# Patient Record
Sex: MALE | Race: WHITE
[De-identification: names, ages, dates, MRNs, and addresses within clinical notes are randomized per-mention and may not be internally consistent; named-entity substitution may affect disease eponyms.]

---

## 2019-03-14 ENCOUNTER — HOSPITAL ENCOUNTER (INPATIENT)
Dept: HOSPITAL 74 - YASAS | Age: 64
LOS: 4 days | Discharge: HOME | DRG: 897 | End: 2019-03-18
Attending: SURGERY | Admitting: SURGERY
Payer: COMMERCIAL

## 2019-03-14 VITALS — BODY MASS INDEX: 26.1 KG/M2

## 2019-03-14 DIAGNOSIS — F10.230: Primary | ICD-10-CM

## 2019-03-14 DIAGNOSIS — D69.6: ICD-10-CM

## 2019-03-14 DIAGNOSIS — E80.6: ICD-10-CM

## 2019-03-14 DIAGNOSIS — E87.6: ICD-10-CM

## 2019-03-14 PROCEDURE — HZ2ZZZZ DETOXIFICATION SERVICES FOR SUBSTANCE ABUSE TREATMENT: ICD-10-PCS | Performed by: SURGERY

## 2019-03-14 RX ADMIN — Medication SCH MG: at 22:31

## 2019-03-14 NOTE — HP
CIWA Score


Nausea/Vomitin-No Nausea/No Vomiting


Muscle Tremors: 7-Severe,w/o Arm Extended


Anxiety: 2


Agitation: 2


Paroxysmal Sweats: No Perspiration


Orientation: 3-Disoriented Date>2 days


Tacttile Disturbances: 0-None


Auditory Disturbances: 0-None


Visual Disturbances: 0-None


Headache: 0-None Present


CIWA-Ar Total Score: 14





- Admission Criteria


[facility] Guidelines: Admission for Medically Managed Detox: 


Requires at least one of the followin. CIWA greater than 12


2. Seizures within the past 24 hours


3. Delirium tremens within the past 24 hours


4. Hallucinations within the past 24 hours


5. Acute intervention needed for co  occurring medical disorder


6. Acute intervention needed for co  occurring psychiatric disorder


7. Severe withdrawal that cannot be handled at a lower level of care (continued


    vomiting, continued diarrhea, abnormal vital signs) requiring intravenous


    medication and/or fluids


8. Pregnancy





Patient presents the following: CIWA greater than 12


Admission Criteria Met: Admission criteria met





Admission ROS [facility]





- [facility]


Allergies/Adverse Reactions: 


 Allergies











Allergy/AdvReac Type Severity Reaction Status Date / Time


 


No Known Allergies Allergy   Verified 19 16:38











History of Present Illness: 





pt here requesting detox from etoh use , reports  drinking  alcohol since early 

 , reports  vodka 1/3 of a  large  bottle daily ,starts drinking after  

lunch  , current  symptoms as above,  loss of appetite  since yesterday ,  

reports tremors if not drinking  , memory loss, denies seizures,  + falls  

while intoxicated  , most recently 1 week ago fell on snow    w/ scraped  knees 

, had  difficulty getting  up " my knees are weak "  . 


denies illicits , denies tobacco  . 


PMHX : htn 


PSHx : denies  


Psych : denies 


meds : denies 


Shx :  lives  alone  , unemployed  


Exam Limitations: Clinical Condition





- Ebola screening


Have you traveled outside of the country in the last 21 days: No


Have you had contact with anyone from an Ebola affected area: No





- Review of Systems


Constitutional: See HPI, Loss of Appetite, Weakness


EENT: reports: Other (glasses  , denies dysphagia)


Respiratory: reports: No Symptoms reported


Cardiac: reports: No Symptoms Reported


GI: reports: See HPI


: reports: No Symptoms Reported


Musculoskeletal: reports: No Symptoms Reported


Integumentary: reports: No Symptoms Reported


Neuro: reports: No Symptoms reported


Psychiatric: reports: Orientated x3, Anxious





Patient History





- Smoking Cessation


Smoking history: Never smoked


Have you smoked in the past 12 months: No





- Substances Abused


  ** Alcohol


Route: Oral


Frequency: Daily


Amount used: 1 LITER OF VODKA


Age of first use: 19


Date of Last Use: 19





Family Disease History





- Family Disease History


Family History: Denies


Family Disease History: Heart Disease: Father (), Mother ()





Admission Physical Exam [facility]





- Physical


General Appearance: Yes: Mild Distress, Alcohol on Breath, Intoxicated, 

Tremorous, Anxious


HEENTM: Yes: EOMI, Hearing grossly Normal, Normocephalic, Normal Voice


Respiratory: Yes: Chest Non-Tender, Lungs Clear, Normal Breath Sounds


Neck: Yes: No masses,lesions,Nodules, Trachea in good position


Cardiology: Yes: Regular Rhythm, Regular Rate, S1, S2, Tachycardia


Abdominal: Yes: Normal Bowel Sounds, Soft


Back: Yes: Normal Inspection


Musculoskeletal: Yes: full range of Motion, Gait Steady


Extremities: Yes: Normal Capillary Refill, Non-Tender, Tremors


Neurological: Yes: Motor Strength 5/5


Integumentary: Yes: Normal Color, Dry





- Diagnostic


(1) Alcohol dependence


Current Visit: Yes   Status: Acute   


Qualifiers: 


   Substance use status: in withdrawal 





Inpatient Rehab Admission





- Rehab Decision to Admit


Inpatient rehab admission?: No

## 2019-03-15 LAB
ALBUMIN SERPL-MCNC: 3.5 G/DL (ref 3.4–5)
ALP SERPL-CCNC: 109 U/L (ref 45–117)
ALT SERPL-CCNC: 31 U/L (ref 13–61)
ANION GAP SERPL CALC-SCNC: 9 MMOL/L (ref 8–16)
AST SERPL-CCNC: 44 U/L (ref 15–37)
BILIRUB SERPL-MCNC: 1.4 MG/DL (ref 0.2–1)
BUN SERPL-MCNC: 9 MG/DL (ref 7–18)
CALCIUM SERPL-MCNC: 9 MG/DL (ref 8.5–10.1)
CHLORIDE SERPL-SCNC: 104 MMOL/L (ref 98–107)
CO2 SERPL-SCNC: 28 MMOL/L (ref 21–32)
CREAT SERPL-MCNC: 0.8 MG/DL (ref 0.55–1.3)
DEPRECATED RDW RBC AUTO: 11.3 % (ref 11.9–15.9)
GLUCOSE SERPL-MCNC: 109 MG/DL (ref 74–106)
HCT VFR BLD CALC: 36.1 % (ref 35.4–49)
HGB BLD-MCNC: 12.6 GM/DL (ref 11.7–16.9)
MCH RBC QN AUTO: 33.8 PG (ref 25.7–33.7)
MCHC RBC AUTO-ENTMCNC: 35 G/DL (ref 32–35.9)
MCV RBC: 96.5 FL (ref 80–96)
PLATELET # BLD AUTO: 67 K/MM3 (ref 134–434)
PMV BLD: 8.9 FL (ref 7.5–11.1)
POTASSIUM SERPLBLD-SCNC: 3.4 MMOL/L (ref 3.5–5.1)
PROT SERPL-MCNC: 7.4 G/DL (ref 6.4–8.2)
RBC # BLD AUTO: 3.74 M/MM3 (ref 4–5.6)
SODIUM SERPL-SCNC: 142 MMOL/L (ref 136–145)
WBC # BLD AUTO: 6 K/MM3 (ref 4–10)

## 2019-03-15 RX ADMIN — Medication SCH MG: at 22:27

## 2019-03-15 RX ADMIN — Medication SCH TAB: at 10:46

## 2019-03-15 RX ADMIN — POTASSIUM CHLORIDE SCH MEQ: 1500 TABLET, EXTENDED RELEASE ORAL at 17:43

## 2019-03-15 NOTE — PN
[facility] CIWA





- CIWA Score


Nausea/Vomitin-No Nausea/No Vomiting


Muscle Tremors: None


Anxiety: 1-Mildly Anxious


Agitation: 0-Normal Activity


Paroxysmal Sweats: 3


Orientation: 2-Disoriented Date<2 days


Tacttile Disturbances: 0-None


Auditory Disturbances: 2-Mild Harshness/Frighten


Visual Disturbances: 2-Mild Sensitivity


Headache: 0-None Present


CIWA-Ar Total Score: 10





[facility] Progress Note (SOAP)


Subjective: 





Interrupted Sleep, Stomach Cramping, Sweating.


Objective: 


PATIENT A & O X 2 (UNCERTAIN ABOUT CURRENT DAY / DATE). PATIENT OBSERVED 

AMBULATING ON UNIT. IN NO ACUTE DISTRESS.





03/15/19 17:21


 Vital Signs











Temperature  98.1 F   03/15/19 13:43


 


Pulse Rate  90   03/15/19 13:43


 


Respiratory Rate  16   03/15/19 13:43


 


Blood Pressure  118/74   03/15/19 13:43


 


O2 Sat by Pulse Oximetry (%)      








 Laboratory Tests











  03/15/19 03/15/19 03/15/19





  07:40 07:40 07:40


 


WBC  6.0  


 


RBC  3.74 L  


 


Hgb  12.6  


 


Hct  36.1  


 


MCV  96.5 H  


 


MCH  33.8 H  


 


MCHC  35.0  


 


RDW  11.3 L  


 


Plt Count  67 L  


 


MPV  8.9  


 


Sodium   142 


 


Potassium   3.4 L 


 


Chloride   104 


 


Carbon Dioxide   28 


 


Anion Gap   9 


 


BUN   9 


 


Creatinine   0.8 


 


Creat Clearance w eGFR   97.63 


 


Random Glucose   109 H 


 


Calcium   9.0 


 


Total Bilirubin   1.4 H 


 


AST   44 H 


 


ALT   31 


 


Alkaline Phosphatase   109 


 


Total Protein   7.4 


 


Albumin   3.5 


 


RPR Titer    Nonreactive








LABS NOTED.


Assessment: 





03/15/19 17:21


WITHDRAWAL SYMPTOMS.


HYPOKALEMIA.


THROMBOCYTOPENIA.


HYPERBILIRUBINEMIA.





03/15/19 17:25








Plan: 





CONTINUE DETOX.


K-DUR, 20 MEQ PO BID.


RE-CHECK K LEVEL ON 2019.


INCREASE DAILY PO FLUID INTAKE.

## 2019-03-16 RX ADMIN — POTASSIUM CHLORIDE SCH MEQ: 1500 TABLET, EXTENDED RELEASE ORAL at 10:20

## 2019-03-16 RX ADMIN — Medication SCH TAB: at 10:20

## 2019-03-16 RX ADMIN — POTASSIUM CHLORIDE SCH MEQ: 1500 TABLET, EXTENDED RELEASE ORAL at 18:16

## 2019-03-16 RX ADMIN — Medication SCH MG: at 22:58

## 2019-03-16 NOTE — PN
S CIWA





- CIWA Score


Nausea/Vomitin-No Nausea/No Vomiting


Muscle Tremors: None


Anxiety: 2


Agitation: 0-Normal Activity


Paroxysmal Sweats: No Perspiration


Orientation: 2-Disoriented Date<2 days


Tacttile Disturbances: 1-Very Mild Itch/Numbness


Auditory Disturbances: 2-Mild Harshness/Frighten


Visual Disturbances: 2-Mild Sensitivity


Headache: 0-None Present


CIWA-Ar Total Score: 9





[facility] Progress Note (SOAP)


Subjective: 





Interrupted Sleep Stomach Cramping.


Objective: 


PATIENT A & O X 2 (UNCERTAIN ABOUT CURRENT DAY / DATE). PATIENT OBSERVED 

AMBULATING ON UNIT. IN NO ACUTE DISTRESS.





19 15:37


 Vital Signs











Temperature  98.8 F   19 14:00


 


Pulse Rate  99 H  19 14:00


 


Respiratory Rate  18   19 14:00


 


Blood Pressure  117/79   19 14:00


 


O2 Sat by Pulse Oximetry (%)      








 Laboratory Tests











  03/15/19 03/15/19 03/15/19





  07:40 07:40 07:40


 


WBC  6.0  


 


RBC  3.74 L  


 


Hgb  12.6  


 


Hct  36.1  


 


MCV  96.5 H  


 


MCH  33.8 H  


 


MCHC  35.0  


 


RDW  11.3 L  


 


Plt Count  67 L  


 


MPV  8.9  


 


Sodium   142 


 


Potassium   3.4 L 


 


Chloride   104 


 


Carbon Dioxide   28 


 


Anion Gap   9 


 


BUN   9 


 


Creatinine   0.8 


 


Creat Clearance w eGFR   97.63 


 


Random Glucose   109 H 


 


Calcium   9.0 


 


Total Bilirubin   1.4 H 


 


AST   44 H 


 


ALT   31 


 


Alkaline Phosphatase   109 


 


Total Protein   7.4 


 


Albumin   3.5 


 


RPR Titer    Nonreactive








LABS NOTED.


Assessment: 





19 15:38


WITHDRAWAL SYMPTOMS.


THROMBOCYTOPENIA.


HYPOKALEMIA.





19 15:38





Plan: 





CONTINUE DETOX.


CONTINUE K-DUR.

## 2019-03-17 RX ADMIN — POTASSIUM CHLORIDE SCH MEQ: 1500 TABLET, EXTENDED RELEASE ORAL at 18:44

## 2019-03-17 RX ADMIN — Medication SCH TAB: at 10:16

## 2019-03-17 RX ADMIN — Medication SCH MG: at 22:37

## 2019-03-17 RX ADMIN — POTASSIUM CHLORIDE SCH MEQ: 1500 TABLET, EXTENDED RELEASE ORAL at 10:16

## 2019-03-17 NOTE — PN
S CIWA





- CIWA Score


Nausea/Vomitin-No Nausea/No Vomiting


Muscle Tremors: 1-None Visible, but Felt


Anxiety: 1-Mildly Anxious


Agitation: 1-Slight > Activity


Paroxysmal Sweats: 1-Minimal Palms Moist


Orientation: 0-Oriented


Tacttile Disturbances: 0-None


Auditory Disturbances: 0-None


Visual Disturbances: 0-None


Headache: 0-None Present


CIWA-Ar Total Score: 4





[facility] Progress Note (SOAP)


Subjective: 





feeling better less tremor mild sweating 


Chinese speaking 63 years old male


appears well rested  social with peers in day room


Objective: 





19 14:56


 Vital Signs











Temperature  96.6 F L  19 13:47


 


Pulse Rate  102 H  19 13:47


 


Respiratory Rate  18   19 13:47


 


Blood Pressure  120/79   19 13:47


 


O2 Sat by Pulse Oximetry (%)      








 Laboratory Last Values











WBC  6.0 K/mm3 (4.0-10.0)   03/15/19  07:40    


 


RBC  3.74 M/mm3 (4.00-5.60)  L  03/15/19  07:40    


 


Hgb  12.6 GM/dL (11.7-16.9)   03/15/19  07:40    


 


Hct  36.1 % (35.4-49)   03/15/19  07:40    


 


MCV  96.5 fl (80-96)  H  03/15/19  07:40    


 


MCH  33.8 pg (25.7-33.7)  H  03/15/19  07:40    


 


MCHC  35.0 g/dl (32.0-35.9)   03/15/19  07:40    


 


RDW  11.3 % (11.9-15.9)  L  03/15/19  07:40    


 


Plt Count  67 K/MM3 (134-434)  L  03/15/19  07:40    


 


MPV  8.9 fl (7.5-11.1)   03/15/19  07:40    


 


Sodium  142 mmol/L (136-145)   03/15/19  07:40    


 


Potassium  4.2 mmol/L (3.5-5.1)   19  07:35    


 


Chloride  104 mmol/L ()   03/15/19  07:40    


 


Carbon Dioxide  28 mmol/L (21-32)   03/15/19  07:40    


 


Anion Gap  9 MMOL/L (8-16)   03/15/19  07:40    


 


BUN  9 mg/dL (7-18)   03/15/19  07:40    


 


Creatinine  0.8 mg/dL (0.55-1.3)   03/15/19  07:40    


 


Creat Clearance w eGFR  97.63  (>60)   03/15/19  07:40    


 


Random Glucose  109 mg/dL ()  H  03/15/19  07:40    


 


Calcium  9.0 mg/dL (8.5-10.1)   03/15/19  07:40    


 


Total Bilirubin  1.4 mg/dL (0.2-1)  H  03/15/19  07:40    


 


AST  44 U/L (15-37)  H  03/15/19  07:40    


 


ALT  31 U/L (13-61)   03/15/19  07:40    


 


Alkaline Phosphatase  109 U/L ()   03/15/19  07:40    


 


Total Protein  7.4 g/dl (6.4-8.2)   03/15/19  07:40    


 


Albumin  3.5 g/dl (3.4-5.0)   03/15/19  07:40    


 


RPR Titer  Nonreactive  (NONREACTIVE)   03/15/19  07:40    








lab noted


Assessment: 





19 14:56


mild alcohol l withdrawal sx 


Plan: 





continue detox

## 2019-03-18 VITALS — TEMPERATURE: 97.3 F | SYSTOLIC BLOOD PRESSURE: 139 MMHG | DIASTOLIC BLOOD PRESSURE: 79 MMHG | HEART RATE: 81 BPM

## 2019-03-18 NOTE — DS
[facility] Detox Discharge Summary


Admission Date: 


03/14/19





Discharge Date: 03/18/19





- History


Present History: Alcohol Dependence


Additional Comments: 





63 years old male admitted on 03/14/19 for alcohol withdrawal stabilization


completed detox regimen [facility]





- Physical Exam Results


Vital Signs: 


 Vital Signs











Temperature  97.3 F L  03/18/19 06:48


 


Pulse Rate  81   03/18/19 06:48


 


Respiratory Rate  18   03/18/19 06:48


 


Blood Pressure  139/79   03/18/19 06:48


 


O2 Sat by Pulse Oximetry (%)      











Pertinent Admission Physical Exam Findings: 





alcohol withdrawal sx


 Laboratory Last Values











WBC  6.0 K/mm3 (4.0-10.0)   03/15/19  07:40    


 


RBC  3.74 M/mm3 (4.00-5.60)  L  03/15/19  07:40    


 


Hgb  12.6 GM/dL (11.7-16.9)   03/15/19  07:40    


 


Hct  36.1 % (35.4-49)   03/15/19  07:40    


 


MCV  96.5 fl (80-96)  H  03/15/19  07:40    


 


MCH  33.8 pg (25.7-33.7)  H  03/15/19  07:40    


 


MCHC  35.0 g/dl (32.0-35.9)   03/15/19  07:40    


 


RDW  11.3 % (11.9-15.9)  L  03/15/19  07:40    


 


Plt Count  67 K/MM3 (134-434)  L  03/15/19  07:40    


 


MPV  8.9 fl (7.5-11.1)   03/15/19  07:40    


 


Sodium  142 mmol/L (136-145)   03/15/19  07:40    


 


Potassium  4.2 mmol/L (3.5-5.1)   03/17/19  07:35    


 


Chloride  104 mmol/L ()   03/15/19  07:40    


 


Carbon Dioxide  28 mmol/L (21-32)   03/15/19  07:40    


 


Anion Gap  9 MMOL/L (8-16)   03/15/19  07:40    


 


BUN  9 mg/dL (7-18)   03/15/19  07:40    


 


Creatinine  0.8 mg/dL (0.55-1.3)   03/15/19  07:40    


 


Creat Clearance w eGFR  97.63  (>60)   03/15/19  07:40    


 


Random Glucose  109 mg/dL ()  H  03/15/19  07:40    


 


Calcium  9.0 mg/dL (8.5-10.1)   03/15/19  07:40    


 


Total Bilirubin  1.4 mg/dL (0.2-1)  H  03/15/19  07:40    


 


AST  44 U/L (15-37)  H  03/15/19  07:40    


 


ALT  31 U/L (13-61)   03/15/19  07:40    


 


Alkaline Phosphatase  109 U/L ()   03/15/19  07:40    


 


Total Protein  7.4 g/dl (6.4-8.2)   03/15/19  07:40    


 


Albumin  3.5 g/dl (3.4-5.0)   03/15/19  07:40    


 


RPR Titer  Nonreactive  (NONREACTIVE)   03/15/19  07:40    








lab noted





- Treatment


Hospital Course: Detox Protocol Followed, Detoxed Safely, Responded well, 

Discharged Condition Good, Rehab Referral Accepted


Patient has Accepted a Rehab Referral to: [facility] 

services





- Medication


Discharge Medications: 


Ambulatory Orders





NK [No Known Home Medication]  03/14/19 











- Diagnosis


(1) Alcohol dependence


Status: Acute   


Qualifiers: 


   Substance use status: in withdrawal 





- AMA


Did Patient Leave Against Medical Advice: No